# Patient Record
Sex: MALE | Race: WHITE | Employment: FULL TIME | URBAN - METROPOLITAN AREA
[De-identification: names, ages, dates, MRNs, and addresses within clinical notes are randomized per-mention and may not be internally consistent; named-entity substitution may affect disease eponyms.]

---

## 2020-09-24 ENCOUNTER — TELEPHONE (OUTPATIENT)
Dept: AUDIOLOGY | Facility: CLINIC | Age: 75
End: 2020-09-24

## 2020-09-28 NOTE — TELEPHONE ENCOUNTER
FUTURE VISIT INFORMATION      FUTURE VISIT INFORMATION:    Date: 9/6/20    Time: 9:30am    [place] [facility] [place]:    Referring provider:  Leny DUONG [facility] [employer]     Reason for visit/diagnosis  cie    RECORDS REQUESTED FROM:       Clinic name Comments Records Status Imaging Status   [facility] ent clinic Request for recs sent to [phone]

## 2020-09-30 DIAGNOSIS — H91.90 HEARING LOSS: Primary | ICD-10-CM

## 2020-10-06 ENCOUNTER — PRE VISIT (OUTPATIENT)
Dept: AUDIOLOGY | Facility: CLINIC | Age: 75
End: 2020-10-06